# Patient Record
Sex: FEMALE | Race: BLACK OR AFRICAN AMERICAN
[De-identification: names, ages, dates, MRNs, and addresses within clinical notes are randomized per-mention and may not be internally consistent; named-entity substitution may affect disease eponyms.]

---

## 2019-02-26 ENCOUNTER — RESULT REVIEW (OUTPATIENT)
Age: 39
End: 2019-02-26

## 2019-06-19 PROBLEM — Z00.00 ENCOUNTER FOR PREVENTIVE HEALTH EXAMINATION: Status: ACTIVE | Noted: 2019-06-19

## 2019-06-24 ENCOUNTER — APPOINTMENT (OUTPATIENT)
Dept: ENDOCRINOLOGY | Facility: CLINIC | Age: 39
End: 2019-06-24

## 2019-07-10 ENCOUNTER — APPOINTMENT (OUTPATIENT)
Dept: ENDOCRINOLOGY | Facility: CLINIC | Age: 39
End: 2019-07-10
Payer: MEDICAID

## 2019-07-10 VITALS
WEIGHT: 199.63 LBS | SYSTOLIC BLOOD PRESSURE: 120 MMHG | BODY MASS INDEX: 34.93 KG/M2 | TEMPERATURE: 98.2 F | HEART RATE: 74 BPM | HEIGHT: 63.19 IN | OXYGEN SATURATION: 98 % | DIASTOLIC BLOOD PRESSURE: 80 MMHG

## 2019-07-10 DIAGNOSIS — F19.90 OTHER PSYCHOACTIVE SUBSTANCE USE, UNSPECIFIED, UNCOMPLICATED: ICD-10-CM

## 2019-07-10 DIAGNOSIS — Z82.49 FAMILY HISTORY OF ISCHEMIC HEART DISEASE AND OTHER DISEASES OF THE CIRCULATORY SYSTEM: ICD-10-CM

## 2019-07-10 DIAGNOSIS — Z83.3 FAMILY HISTORY OF DIABETES MELLITUS: ICD-10-CM

## 2019-07-10 DIAGNOSIS — Z86.39 PERSONAL HISTORY OF OTHER ENDOCRINE, NUTRITIONAL AND METABOLIC DISEASE: ICD-10-CM

## 2019-07-10 DIAGNOSIS — Z87.891 PERSONAL HISTORY OF NICOTINE DEPENDENCE: ICD-10-CM

## 2019-07-10 PROCEDURE — 36415 COLL VENOUS BLD VENIPUNCTURE: CPT

## 2019-07-10 PROCEDURE — 99204 OFFICE O/P NEW MOD 45 MIN: CPT | Mod: 25

## 2019-07-11 PROBLEM — F19.90 DRUG USE: Status: ACTIVE | Noted: 2019-07-10

## 2019-07-11 PROBLEM — Z82.49 FAMILY HISTORY OF HYPERTENSION: Status: ACTIVE | Noted: 2019-07-10

## 2019-07-11 PROBLEM — Z87.891 FORMER SMOKER: Status: ACTIVE | Noted: 2019-07-10

## 2019-07-11 PROBLEM — Z83.3 FAMILY HISTORY OF DIABETES MELLITUS: Status: ACTIVE | Noted: 2019-07-10

## 2019-07-11 PROBLEM — Z86.39 HISTORY OF PITUITARY ADENOMA: Status: RESOLVED | Noted: 2019-07-10 | Resolved: 2019-07-11

## 2019-07-11 LAB
ALBUMIN SERPL ELPH-MCNC: 4.4 G/DL
ALP BLD-CCNC: 77 U/L
ALT SERPL-CCNC: 12 U/L
ANION GAP SERPL CALC-SCNC: 13 MMOL/L
AST SERPL-CCNC: 20 U/L
BILIRUB SERPL-MCNC: <0.2 MG/DL
BUN SERPL-MCNC: 11 MG/DL
CALCIUM SERPL-MCNC: 9.4 MG/DL
CHLORIDE SERPL-SCNC: 103 MMOL/L
CO2 SERPL-SCNC: 23 MMOL/L
CORTIS SERPL-MCNC: 8.4 UG/DL
CREAT SERPL-MCNC: 0.81 MG/DL
ESTRADIOL SERPL-MCNC: 20 PG/ML
GLUCOSE SERPL-MCNC: 93 MG/DL
HCG SERPL-MCNC: <1 MIU/ML
POTASSIUM SERPL-SCNC: 4.3 MMOL/L
PROT SERPL-MCNC: 7 G/DL
SODIUM SERPL-SCNC: 139 MMOL/L
T4 FREE SERPL-MCNC: 0.9 NG/DL
TSH SERPL-ACNC: 0.97 UIU/ML

## 2019-07-11 NOTE — PHYSICAL EXAM
[Alert] : alert [No Acute Distress] : no acute distress [Well Nourished] : well nourished [Well Developed] : well developed [Healthy Appearance] : healthy appearance [Normal Voice/Communication] : normal voice communication [Normal Sclera/Conjunctiva] : normal sclera/conjunctiva [No Proptosis] : no proptosis [Normal Oropharynx] : the oropharynx was normal [No Neck Mass] : no neck mass was observed [Supple] : the neck was supple [No LAD] : no lymphadenopathy [Thyroid Not Enlarged] : the thyroid was not enlarged [No Thyroid Nodules] : there were no palpable thyroid nodules [No Respiratory Distress] : no respiratory distress [Normal Rate and Effort] : normal respiratory rhythm and effort [No Accessory Muscle Use] : no accessory muscle use [Clear to Auscultation] : lungs were clear to auscultation bilaterally [Normal S1, S2] : normal S1 and S2 [Normal Rate] : heart rate was normal  [Regular Rhythm] : with a regular rhythm [No Edema] : there was no peripheral edema [Normal Appearance] : normal in appearance [No Masses] : no palpable masses [Not Tender] : non-tender [Soft] : abdomen soft [Not Distended] : not distended [Spine Straight] : spine straight [No Stigmata of Cushings Syndrome] : no stigmata of cushings syndrome [Normal Gait] : normal gait [No Clubbing, Cyanosis] : no clubbing  or cyanosis of the fingernails [No Involuntary Movements] : no involuntary movements were seen [Hirsutism] : no hirsutism [Acanthosis Nigricans] : no acanthosis nigricans [Normal Insight/Judgement] : insight and judgment were intact [Normal Affect] : the affect was normal [Normal Mood] : the mood was normal [de-identified] : right expressible galactorrhea

## 2019-07-11 NOTE — ASSESSMENT
[FreeTextEntry1] : This is a 38-year-old female with hyperprolactinemia due to pituitary adenoma, here for consultation.\par 1. Hyperprolactinemia\par Blood work from her gynecologist was requested.\par Will likely need to resume Cabergoline.\par She is not currently trying to conceive.\par 2. Pituitary adenoma\par Prior MRI report has been requested.\par Check CMP. Check HCG. Will order MRI pituitary pending results.\par Check anterior pituitary hormones.

## 2019-07-11 NOTE — CONSULT LETTER
[Dear  ___] : Dear  [unfilled], [Consult Letter:] : I had the pleasure of evaluating your patient, [unfilled]. [Consult Closing:] : Thank you very much for allowing me to participate in the care of this patient.  If you have any questions, please do not hesitate to contact me. [Please see my note below.] : Please see my note below. [Sincerely,] : Sincerely, [FreeTextEntry3] : Juan Kan M.D., F.A.CJanineE.\par

## 2019-07-11 NOTE — HISTORY OF PRESENT ILLNESS
[FreeTextEntry1] : CC: High prolactin \par This is a 38-year-old female with hyperprolactinemia due to pituitary adenoma (MRI is not available at this time), obesity, here for consultation.\par She reports that at the each of 35 she experienced headaches and irregular periods and was found to have hyperprolactinemia and a pituitary adenoma. She was started on Cabergoline but she stopped it 2 years ago due to insurance issues. She took Cabergoline for one year.\par She recently saw her gynecologist who did blood work and found an elevated prolactin(labs are unavailable at this time).\par She denies galactorrhea. She is not currently trying to conceive.\par LMP 6-26 or 6-27.

## 2019-07-12 LAB
IGF-1 INTERP: NORMAL
IGF-I BLD-MCNC: 183 NG/ML

## 2019-07-16 LAB
FSH SERPL-MCNC: 4.6 IU/L
LH SERPL-ACNC: 11.3 IU/L
PROLACTIN SERPL-MCNC: 109 NG/ML
PROLACTIN SERPL-MCNC: 114 NG/ML

## 2019-07-31 ENCOUNTER — APPOINTMENT (OUTPATIENT)
Dept: ENDOCRINOLOGY | Facility: CLINIC | Age: 39
End: 2019-07-31
Payer: MEDICAID

## 2019-07-31 VITALS
HEART RATE: 81 BPM | SYSTOLIC BLOOD PRESSURE: 100 MMHG | OXYGEN SATURATION: 98 % | WEIGHT: 199 LBS | BODY MASS INDEX: 34.82 KG/M2 | HEIGHT: 63.19 IN | DIASTOLIC BLOOD PRESSURE: 60 MMHG | TEMPERATURE: 98.1 F

## 2019-07-31 DIAGNOSIS — E22.1 HYPERPROLACTINEMIA: ICD-10-CM

## 2019-07-31 DIAGNOSIS — D35.2 BENIGN NEOPLASM OF PITUITARY GLAND: ICD-10-CM

## 2019-07-31 PROCEDURE — 99213 OFFICE O/P EST LOW 20 MIN: CPT

## 2019-07-31 RX ORDER — CABERGOLINE 0.5 MG/1
0.5 TABLET ORAL
Qty: 16 | Refills: 0 | Status: ACTIVE | COMMUNITY
Start: 2019-07-31 | End: 1900-01-01

## 2019-07-31 NOTE — ASSESSMENT
[FreeTextEntry1] : This is a 38 year old female with hyperprolactinemia, pituitary adenoma, here for follow up. \par Check MRI pituitary with and without gadolinium as she reports she has not had MRI in one year. \par Records from prior Endocrinologist have been requested again. \par Start Cabergoline 0.5mg 2 x week. Potential side effects were reviewed and she was advised to call me with any side effects.\par She was referred to Dr. Ball. \par She is not currently trying to conceive.\par Follow up in 3 weeks. \par

## 2019-07-31 NOTE — HISTORY OF PRESENT ILLNESS
[FreeTextEntry1] : CC: High prolactin \par This is a 38-year-old female with hyperprolactinemia, pituitary adenoma, obesity, here for follow up. MRI results from prior Endocrinologist have been requested. \par \par She reports that at the age of 35 she experienced headaches and irregular periods and was found to have hyperprolactinemia and a pituitary adenoma. She was started on Cabergoline but she stopped it 2 years ago due to insurance issues. She took Cabergoline for one year.\par She recently saw her gynecologist who did blood work and found an elevated prolactin.\par \par She denies galactorrhea. She is not currently trying to conceive.

## 2019-07-31 NOTE — PHYSICAL EXAM
[Alert] : alert [No Acute Distress] : no acute distress [Well Nourished] : well nourished [Well Developed] : well developed [Healthy Appearance] : healthy appearance [Normal Voice/Communication] : normal voice communication [No Proptosis] : no proptosis [No Respiratory Distress] : no respiratory distress [Normal Rate and Effort] : normal respiratory rhythm and effort [No Accessory Muscle Use] : no accessory muscle use [Normal Rate] : heart rate was normal  [Spine Straight] : spine straight [No Stigmata of Cushings Syndrome] : no stigmata of cushings syndrome [Normal Gait] : normal gait [No Clubbing, Cyanosis] : no clubbing  or cyanosis of the fingernails [No Involuntary Movements] : no involuntary movements were seen [Hirsutism] : no hirsutism [Acanthosis Nigricans] : no acanthosis nigricans